# Patient Record
Sex: FEMALE | Race: WHITE | NOT HISPANIC OR LATINO | Employment: FULL TIME | URBAN - METROPOLITAN AREA
[De-identification: names, ages, dates, MRNs, and addresses within clinical notes are randomized per-mention and may not be internally consistent; named-entity substitution may affect disease eponyms.]

---

## 2018-02-08 ENCOUNTER — TELEPHONE (OUTPATIENT)
Dept: SURGERY | Facility: CLINIC | Age: 53
End: 2018-02-08

## 2018-02-08 NOTE — TELEPHONE ENCOUNTER
Spoke to pt regarding referral from Dr. Torres and Dr. Kassidy Gage (NEPHROLOGIST).  Advised to pt that we will need her records and the imaging that showed her adrenal mass. Advised to the pt that I can also try to call Dr. Gage's office to see if I can obtain anything.    Called the # that is listed in provider finder and spoke to a young lady who advised last time pt was see was 2015. Pt confirms this.

## 2018-02-14 NOTE — TELEPHONE ENCOUNTER
Pt provided phone #'s to her pcp, cardiologist, and Nephrologist. Added providers to the pts care team.

## 2018-02-14 NOTE — TELEPHONE ENCOUNTER
Spoke to nurse in Dr. Gage's office who confirmed that she will fax over notes now.     Spoke to  at [employer] who will fax a years worth labs.     Spoke to [facility] imaging  who will dl all abd films on cd [facility]

## 2018-02-21 NOTE — TELEPHONE ENCOUNTER
Message   Received: Today      Pt Advice   Message Contents   Matheus DelgadouAdriang Sushma Staff  Caller: Unspecified (Today, 12:29 PM)         Patient (Lauryn Malone) states that she was speaking with the nurse in ref to getting her records from another facility;caller would like to know if her records were received;did not find a chart for the pt not sure if you may have put it in under something different//please call back at [phone]//thank you

## 2018-02-21 NOTE — TELEPHONE ENCOUNTER
Called pt back but no answer.     lvm stating that I have recived her cd but not the last current imaging on the cd.  Requested call back to confirm if she has had a recent scan or does not.

## 2018-02-22 DIAGNOSIS — E27.8 ADRENAL MASS: Primary | ICD-10-CM

## 2018-03-05 ENCOUNTER — TELEPHONE (OUTPATIENT)
Dept: SURGERY | Facility: CLINIC | Age: 53
End: 2018-03-05

## 2018-03-05 NOTE — TELEPHONE ENCOUNTER
THE PT needs an adrenal protocol ct abd with and without contrast.  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 Herr to order MRI of abd with and without contrast.  Gadolinium will be given to the pt instead of contrast.

## 2025-02-05 ENCOUNTER — CLINICAL SUPPORT (OUTPATIENT)
Dept: RESPIRATORY THERAPY | Facility: HOSPITAL | Age: 60
End: 2025-02-05
Attending: FAMILY MEDICINE
Payer: COMMERCIAL

## 2025-02-05 DIAGNOSIS — I49.9 ABNORMAL HEART RHYTHM: ICD-10-CM

## 2025-02-05 DIAGNOSIS — I49.9 ABNORMAL HEART RHYTHM: Primary | ICD-10-CM

## 2025-02-05 PROCEDURE — 93010 ELECTROCARDIOGRAM REPORT: CPT | Mod: ,,, | Performed by: INTERNAL MEDICINE

## 2025-02-05 PROCEDURE — 93005 ELECTROCARDIOGRAM TRACING: CPT

## 2025-02-06 LAB
OHS QRS DURATION: 78 MS
OHS QTC CALCULATION: 467 MS